# Patient Record
Sex: FEMALE | URBAN - METROPOLITAN AREA
[De-identification: names, ages, dates, MRNs, and addresses within clinical notes are randomized per-mention and may not be internally consistent; named-entity substitution may affect disease eponyms.]

---

## 2021-11-09 ENCOUNTER — APPOINTMENT (RX ONLY)
Dept: URBAN - METROPOLITAN AREA CLINIC 56 | Facility: CLINIC | Age: 61
Setting detail: DERMATOLOGY
End: 2021-11-09

## 2021-11-09 DIAGNOSIS — L72.0 EPIDERMAL CYST: ICD-10-CM

## 2021-11-09 DIAGNOSIS — L20.89 OTHER ATOPIC DERMATITIS: ICD-10-CM | Status: STABLE

## 2021-11-09 DIAGNOSIS — L91.8 OTHER HYPERTROPHIC DISORDERS OF THE SKIN: ICD-10-CM

## 2021-11-09 DIAGNOSIS — L71.0 PERIORAL DERMATITIS: ICD-10-CM | Status: INADEQUATELY CONTROLLED

## 2021-11-09 PROBLEM — L20.84 INTRINSIC (ALLERGIC) ECZEMA: Status: ACTIVE | Noted: 2021-11-09

## 2021-11-09 PROCEDURE — ? INCISION AND DRAINAGE

## 2021-11-09 PROCEDURE — ? TREATMENT REGIMEN

## 2021-11-09 PROCEDURE — 99203 OFFICE O/P NEW LOW 30 MIN: CPT | Mod: 25

## 2021-11-09 PROCEDURE — ? COUNSELING

## 2021-11-09 PROCEDURE — 10060 I&D ABSCESS SIMPLE/SINGLE: CPT

## 2021-11-09 PROCEDURE — ? PRESCRIPTION

## 2021-11-09 RX ORDER — DOXYCYCLINE 100 MG/1
CAPSULE ORAL
Qty: 60 | Refills: 1 | Status: ERX | COMMUNITY
Start: 2021-11-09

## 2021-11-09 RX ORDER — CLOBETASOL PROPIONATE 0.5 MG/G
CREAM TOPICAL
Qty: 45 | Refills: 1 | Status: ERX | COMMUNITY
Start: 2021-11-09

## 2021-11-09 RX ADMIN — DOXYCYCLINE: 100 CAPSULE ORAL at 00:00

## 2021-11-09 RX ADMIN — CLOBETASOL PROPIONATE: 0.5 CREAM TOPICAL at 00:00

## 2021-11-09 ASSESSMENT — LOCATION DETAILED DESCRIPTION DERM: LOCATION DETAILED: INFERIOR THORACIC SPINE

## 2021-11-09 ASSESSMENT — LOCATION SIMPLE DESCRIPTION DERM: LOCATION SIMPLE: UPPER BACK

## 2021-11-09 ASSESSMENT — LOCATION ZONE DERM: LOCATION ZONE: TRUNK

## 2021-11-09 NOTE — PROCEDURE: COUNSELING
Detail Level: Detailed
Patient Specific Counseling (Will Not Stick From Patient To Patient): \\Darnell patient's request, lesion was snipped off with Iris scissors after prepping the area with alcohol.
Patient Specific Counseling (Will Not Stick From Patient To Patient): \\nPotential side effects and complications of doxycycline discussed such as nausea, vomiting, reflux, photosensitivity, headaches, diarrhea, and drug eruptions.  Patient voiced understanding.

## 2021-11-09 NOTE — PROCEDURE: INCISION AND DRAINAGE
Drainage Amount?: moderate
Anesthesia Type: 2% lidocaine with epinephrine
Epidermal Closure: simple interrupted
Render Postcare In Note?: Yes
Epidermal Sutures: 4-0 Ethilon
Lesion Type: Cyst
Consent was obtained and risks were reviewed including but not limited to delayed wound healing, infection, need for multiple I and D's, and pain.
Wound Care: Vaseline
Detail Level: Detailed
Size Of Lesion In Cm (Optional But May Be Required For Some Insurances): 0.8
Preparation Text: The area was prepped in the usual clean fashion.
Method: 11 blade
Curette Text (Optional): After the contents were expressed, a curette was used to remove as much cyst wall as possible.
Include Sutures?: No
Dressing: dry sterile dressing
Drainage Type?: keratinaceous
Suture Text: The incision was partially closed with
Anesthesia Volume In Cc: 3
Post-Care Instructions: I reviewed with the patient in detail post-care instructions.  Patient will begin daily tap water irrigation followed by Vaseline and a bandage until healed.

## 2021-11-09 NOTE — PROCEDURE: TREATMENT REGIMEN
Plan: \\n1.  Wash face with cooler water.  Use CeraVe foaming facial cleanser as a soap.  Do not rub or scrub.\\n2.  Begin doxycycline 100 mg by mouth twice a day with food and water for 3 weeks, then 100 mg by mouth daily for 3 weeks, then discontinue.\\n3.  Avoid topical steroids on face.
Plan: \\n1.  Thin cotton gloves followed by vinyl gloves when doing wet work, preparing what foods, cleaning, doing hairdressing etc.\\n2.  Moisturize hands numerous times a day with a moisturizing cream or ointment (not lotion)\\n3.  Clobetasol 0.05% cream twice a day when necessary up to 15 days per month maximum.  Not to be used on face or body folds.  Patient voiced understanding.
Detail Level: Detailed

## 2021-11-09 NOTE — HPI: OTHER
Condition:: Painful cyst on back
Please Describe Your Condition:: She requests removal of a painful epidermal inclusion cyst on her mid back.  It has been present at least many months.  It is getting a little larger and is painful occasionally.  It has not been treated previously.
Condition:: Rash on face
Please Describe Your Condition:: Patient has been doing a \"liver detoxification\" this past month.  During that time, she noticed eruption crop up on her left lower eyelid and chin area.  Since discontinuing the detoxification and using a \"natural\" lotion, her face has improved some but has not resolved.  She has had a similar problem in the past that has resolved with the use of this natural lotion.  She cannot give me any more details regarding ingredients in the lotion.
Condition:: Chronic hand eczema
Please Describe Your Condition:: Patient is a hairstylist and a massage therapist.  She does not wear gloves when working.  She has been dealing with chronic hand eczema for years.  She has used clobetasol cream, fluocinonide ointment, and fluocinonide cream.  She believes the clobetasol cream works the best.  She ran out of that medication.  She only uses it when her hands are severely affected which is not very often.  She understands not to use it on the face and body folds.